# Patient Record
Sex: MALE | Race: WHITE | NOT HISPANIC OR LATINO | Employment: FULL TIME | URBAN - METROPOLITAN AREA
[De-identification: names, ages, dates, MRNs, and addresses within clinical notes are randomized per-mention and may not be internally consistent; named-entity substitution may affect disease eponyms.]

---
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
## 2024-03-06 ENCOUNTER — OFFICE VISIT (OUTPATIENT)
Dept: UROLOGY | Facility: CLINIC | Age: 19
End: 2024-03-06
Attending: SPECIALIST
Payer: MEDICAID

## 2024-03-06 VITALS
SYSTOLIC BLOOD PRESSURE: 102 MMHG | DIASTOLIC BLOOD PRESSURE: 56 MMHG | HEIGHT: 66 IN | OXYGEN SATURATION: 99 % | HEART RATE: 77 BPM | WEIGHT: 183.63 LBS | BODY MASS INDEX: 29.51 KG/M2

## 2024-03-06 DIAGNOSIS — N13.1 HYDRONEPHROSIS WITH URETERAL STRICTURE, NOT ELSEWHERE CLASSIFIED: ICD-10-CM

## 2024-03-06 DIAGNOSIS — N13.5 URETEROPELVIC JUNCTION (UPJ) OBSTRUCTION, LEFT: Primary | ICD-10-CM

## 2024-03-06 PROCEDURE — 99203 OFFICE O/P NEW LOW 30 MIN: CPT | Mod: S$PBB,,, | Performed by: SPECIALIST

## 2024-03-06 PROCEDURE — 1160F RVW MEDS BY RX/DR IN RCRD: CPT | Mod: CPTII,,, | Performed by: SPECIALIST

## 2024-03-06 PROCEDURE — 3008F BODY MASS INDEX DOCD: CPT | Mod: CPTII,,, | Performed by: SPECIALIST

## 2024-03-06 PROCEDURE — 1159F MED LIST DOCD IN RCRD: CPT | Mod: CPTII,,, | Performed by: SPECIALIST

## 2024-03-06 PROCEDURE — 99213 OFFICE O/P EST LOW 20 MIN: CPT | Mod: PBBFAC | Performed by: SPECIALIST

## 2024-03-06 PROCEDURE — 3078F DIAST BP <80 MM HG: CPT | Mod: CPTII,,, | Performed by: SPECIALIST

## 2024-03-06 PROCEDURE — 3074F SYST BP LT 130 MM HG: CPT | Mod: CPTII,,, | Performed by: SPECIALIST

## 2024-03-06 PROCEDURE — 99999 PR PBB SHADOW E&M-EST. PATIENT-LVL III: CPT | Mod: PBBFAC,,, | Performed by: SPECIALIST

## 2024-03-08 NOTE — PROGRESS NOTES
[facility] - Urology  Urology  History & Physical    Patient Name: Ammon Duong  MRN: [id number]  Admission Date: (Not on file)  Code Status: [unfilled]   Attending Provider: AURELIO SINGH MD  Primary Care Physician: Sudeep Lazo MD  Principal Problem:[unfilled]    Subjective:     HPI:   Pleasant 18 y.o. M with a recent episode of flank pain and hematuria who presented to the ED.  CT scan c/w a left UPJO.  No stones, no masses.  Mild NASRIN with Creatinine of 1.1.  UA negative for leuks/nitrites.  He's otherwise in his usual state of good health.    History reviewed. No pertinent past medical history.    Past Surgical History:   Procedure Laterality Date    ADENOIDECTOMY      EYE SURGERY      TONSILLECTOMY      WISDOM TOOTH EXTRACTION Bilateral 11/2023       Review of patient's allergies indicates:   Allergen Reactions    Latex, natural rubber      SKIN IRRITATION    Penicillins      RASH       Family History       Problem Relation (Age of Onset)    No Known Problems Mother, Father            Tobacco Use    Smoking status: Never    Smokeless tobacco: Never   Substance and Sexual Activity    Alcohol use: No    Drug use: No    Sexual activity: Never       Review of Systems   All other systems reviewed and are negative.    Objective:     [unfilled]     Body mass index is 29.64 kg/m².    [unfilled]       Lines/Drains/Airways       None                   Physical Exam  Constitutional:       Appearance: Normal appearance. He is normal weight.   Neurological:      Mental Status: He is alert.   Psychiatric:         Mood and Affect: Mood normal.         Behavior: Behavior normal.     Significant Labs:  BMP:  @LABRCNTIP(NA:3,K:3,CL:3,co2:3,bun:3,creatinine:3,labglom:3,glucose,calcium:3)@    CBC:  @LABRCNTIP(wbc:3,hgb:3,hct:3,PLT:3)@    All pertinent labs results from the past 24 hours have been reviewed.    Significant Imaging:  CT: I have reviewed all results within the past 24  hours and my personal findings are:  UPJO, left    Assessment and Plan:   Imp: Left UPJO  Plan: Lasix Renal Scan  RTC one week.  There are no hospital problems to display for this patient.  AURELIO SINGH MD  Urology  [facility] - Urology

## 2024-03-22 ENCOUNTER — OFFICE VISIT (OUTPATIENT)
Dept: UROLOGY | Facility: CLINIC | Age: 19
End: 2024-03-22
Attending: SPECIALIST
Payer: MEDICAID

## 2024-03-22 VITALS
OXYGEN SATURATION: 97 % | SYSTOLIC BLOOD PRESSURE: 128 MMHG | BODY MASS INDEX: 29.72 KG/M2 | HEART RATE: 82 BPM | HEIGHT: 66 IN | WEIGHT: 184.94 LBS | DIASTOLIC BLOOD PRESSURE: 64 MMHG

## 2024-03-22 DIAGNOSIS — N13.5 URETEROPELVIC JUNCTION (UPJ) OBSTRUCTION, LEFT: Primary | ICD-10-CM

## 2024-03-22 PROCEDURE — 99999 PR PBB SHADOW E&M-EST. PATIENT-LVL III: CPT | Mod: PBBFAC,,, | Performed by: SPECIALIST

## 2024-03-22 PROCEDURE — 1160F RVW MEDS BY RX/DR IN RCRD: CPT | Mod: CPTII,,, | Performed by: SPECIALIST

## 2024-03-22 PROCEDURE — 99213 OFFICE O/P EST LOW 20 MIN: CPT | Mod: PBBFAC | Performed by: SPECIALIST

## 2024-03-22 PROCEDURE — 3078F DIAST BP <80 MM HG: CPT | Mod: CPTII,,, | Performed by: SPECIALIST

## 2024-03-22 PROCEDURE — 99213 OFFICE O/P EST LOW 20 MIN: CPT | Mod: S$PBB,,, | Performed by: SPECIALIST

## 2024-03-22 PROCEDURE — 3008F BODY MASS INDEX DOCD: CPT | Mod: CPTII,,, | Performed by: SPECIALIST

## 2024-03-22 PROCEDURE — 1159F MED LIST DOCD IN RCRD: CPT | Mod: CPTII,,, | Performed by: SPECIALIST

## 2024-03-22 PROCEDURE — 3074F SYST BP LT 130 MM HG: CPT | Mod: CPTII,,, | Performed by: SPECIALIST

## 2024-04-11 NOTE — PROGRESS NOTES
"[facility] - Urology   Clinic Note    SUBJECTIVE:     Chief Complaint   Patient presents with    Follow-up     2 week follow up       Referral from: No ref. provider found.    History of Present Illness:  Ammon Duong is a 18 y.o. male who presents to clinic for follow up for UPJO.      History reviewed. No pertinent past medical history.    Current Outpatient Medications on File Prior to Visit   Medication Sig Dispense Refill    diclofenac (VOLTAREN) 75 MG EC tablet Take 1 tablet (75 mg total) by mouth 2 (two) times daily. Take 1 po bid prn pain (Patient not taking: Reported on 3/22/2024) 20 tablet 0    HYDROcodone-acetaminophen (NORCO) 7.5-325 mg per tablet Take 1 tablet by mouth every 6 (six) hours as needed for Pain. (Patient not taking: Reported on 3/22/2024) 12 tablet 0    ondansetron (ZOFRAN-ODT) 4 MG TbDL TAKE 1 TABLET EVERY 6 HRS AS NEEDED FOR NAUSEA/VOMITING (Patient not taking: Reported on 3/22/2024) 20 tablet 0     No current facility-administered medications on file prior to visit.         OBJECTIVE:     Estimated body mass index is 29.85 kg/m² as calculated from the following:    Height as of this encounter: 5' 6" (1.676 m).    Weight as of this encounter: 83.9 kg (184 lb 15.5 oz).    Vital Signs (Most Recent)  Vitals:    03/22/24 1353   BP: 128/64   Pulse: 82       Physical Exam:    Physical Exam     GENERAL: patient sitting comfortably  PSYCH: appropriate mood and affect    Genitourinary Exam:  deferred      LABS:     Lab Results   Component Value Date    BUN 15 10/24/2023    CREATININE 1.14 10/24/2023    WBC 6.80 10/24/2023    HGB 14.2 10/24/2023    HCT 42.1 10/24/2023     10/24/2023    AST 26 10/24/2023    ALT 24 10/24/2023    ALKPHOS 65 10/24/2023    ALBUMIN 4.9 (H) 10/24/2023        Urinalysis:   No results found for: "UAREFLEX"     PSA:  No results found for: "PSA", "PSADIAG", "PSATOTAL", "PSAFREE"    Testosterone:  No results found for: "TOTALTESTOST", "TESTOSTERONE" "     Imaging:  I have personally reviewed all relevant imaging studies.    No results found for this or any previous visit (from the past 2160 hour(s)).  No results found for this or any previous visit (from the past 2160 hour(s)).  CT Renal Stone Study ABD Pelvis WO  Narrative: EXAMINATION:  CT RENAL STONE STUDY ABD PELVIS WO    CLINICAL HISTORY:  left flank pain, hematuria;    CT/nuclear cardiac exams in previous 12 months: None    TECHNIQUE:  Axial CT images were obtained and evaluated with coronal reformatted images.  Iterative reconstruction technique was used.    COMPARISON:  None    FINDINGS:  Visualized lungs are clear.  No abnormality identified of the nonenhanced liver, pancreas, spleen, adrenal glands or right kidney.  There is moderate to severe left hydronephrosis without an identified renal or ureteral stone.  Gallbladder is unremarkable.  No bowel dilatation.  Appendix appears normal.  Urinary bladder is unremarkable.  Impression: Moderate to severe left hydronephrosis with no identified renal or ureteral stone.  This could be secondary to a stricture at the left UPJ.    Electronically signed by: Eddie Jose MD  Date:    10/24/2023  Time:    12:39         ASSESSMENT     1. Ureteropelvic junction (UPJ) obstruction, left        PLAN:     Lasix Renal Scan  RTC after above    Sav Johnston MD  Urology  [facility]     Disclaimer: This note has been generated using voice-recognition software. There may be typographical errors that have been missed during proof-reading.

## 2024-04-12 ENCOUNTER — TELEPHONE (OUTPATIENT)
Dept: UROLOGY | Facility: CLINIC | Age: 19
End: 2024-04-12

## 2024-04-12 NOTE — TELEPHONE ENCOUNTER
"Notified patient per Dr. Johnston "I would like a renal scan to further assess his UPJO, and prior to writing some form of medical clearance letter. On rare occasion, UPJ's spontaneously rupture or could lead to morbidity including but not limited to urosepsis, stones, loss of renal function. Please advise " and states none of this was discussed at his visits and that he isn't sure why he cares so much about it now. States that he doesn't have insurance and starts his job Monday and that's when he'll be able to get his insurance back and continue care. Notified him that I could put another message in but a letter won't be sent until he gets the renal scan. States he needs this matter resolved today and for me to get Dr. Johnston to write the letter. V/u.  "

## 2024-04-12 NOTE — TELEPHONE ENCOUNTER
Returned patients call and wanted to know if Dr. Johnston will send the clearance letter. Notifed him that he will not unless he first has the renal scan as we discussed via phone earlier. States he doesn't understand why he's doing this and ask if we understand the situation he's in. Stated Dr. Johnston never stated any restrictions before and isn't sure why he's implementing any now. He asks if Dr. Johnston expects him to pay out of pocket for the renal scan and wants me to call and see when next available is but isn't sure if he will do it. Says he's starting his job Monday as he stated before. Offered to give him number to reschedule his renal scan and declined, wants me to call. Asks if he could drop Dr. Johnston as a Urologist and find another and let him know that is his decision if he would prefer finding another.  V/u.

## 2024-04-12 NOTE — TELEPHONE ENCOUNTER
----- Message from Loren Mckeon sent at 2024 12:07 PM CDT -----  Contact: Patient  Ammon Duong  MRN: [id number]  : 2005  PCP: Sudeep Lazo  Home Phone      [phone]  Work Phone      Not on file.  Mobile          [phone]      MESSAGE: Patient needs to speak with someone immediately regarding this clearance letter. Please return this call as soon as possible    PHONE; [phone]

## 2024-04-12 NOTE — TELEPHONE ENCOUNTER
----- Message from Sav Johnston MD sent at 2024  2:13 PM CDT -----  Contact: Patient  I would like a renal scan to further assess his UPJO, and prior to writing some form of medical clearance letter.  On rare occasion, UPJ's spontaneously rupture or could lead to morbidity including but not limited to urosepsis, stones, loss of renal function.  Please advise  M  ----- Message -----  From: Lucina White MA  Sent: 2024  11:10 AM CDT  To: Sav Johnston MD    Patient canceled Renal Scan scheduled for tomorrow at [facility]. Is wanting a letter stating he is cleared to work, please advise.  ----- Message -----  From: Loren Mckeon  Sent: 2024  10:57 AM CDT  To: Preston Yuen    Ammon Duong  MRN: [id number]  : 2005  PCP: Sudeep Lazo  Home Phone      [phone]  Work Phone      Not on file.  Mobile          [phone]      MESSAGE: Patient is trying to get a job and needs a letter stating that he is fully able to work with no restrictions. Please return this call    PHONE; [phone]